# Patient Record
Sex: MALE | Race: WHITE | NOT HISPANIC OR LATINO | Employment: FULL TIME | URBAN - METROPOLITAN AREA
[De-identification: names, ages, dates, MRNs, and addresses within clinical notes are randomized per-mention and may not be internally consistent; named-entity substitution may affect disease eponyms.]

---

## 2017-03-23 ENCOUNTER — HOSPITAL ENCOUNTER (OUTPATIENT)
Dept: FAMILY MEDICINE CLINIC | Facility: CLINIC | Age: 57
Setting detail: SPECIMEN
Discharge: HOME | End: 2017-03-23
Attending: FAMILY MEDICINE | Admitting: FAMILY MEDICINE

## 2017-03-23 LAB
ALBUMIN SERPL-MCNC: 3.7 G/DL (ref 3.5–4.8)
ALBUMIN/GLOB SERPL: 1.1 {RATIO} (ref 1–1.7)
ALP SERPL-CCNC: 53 IU/L (ref 32–91)
ALT SERPL-CCNC: 36 IU/L (ref 17–63)
ANION GAP SERPL CALC-SCNC: 13.8 MMOL/L (ref 10–20)
AST SERPL-CCNC: 22 IU/L (ref 15–41)
BILIRUB SERPL-MCNC: 0.7 MG/DL (ref 0.3–1.2)
BUN SERPL-MCNC: 12 MG/DL (ref 8–20)
BUN/CREAT SERPL: 17.1 (ref 6.2–20.3)
CALCIUM SERPL-MCNC: 9.6 MG/DL (ref 8.9–10.3)
CHLORIDE SERPL-SCNC: 105 MMOL/L (ref 101–111)
CHOLEST SERPL-MCNC: 206 MG/DL
CHOLEST/HDLC SERPL: 7.5 {RATIO}
CONV CO2: 27 MMOL/L (ref 22–32)
CONV LDL CHOLESTEROL DIRECT: 109 MG/DL (ref 0–100)
CONV TOTAL PROTEIN: 7.2 G/DL (ref 6.1–7.9)
CREAT UR-MCNC: 0.7 MG/DL (ref 0.7–1.2)
GLOBULIN UR ELPH-MCNC: 3.5 G/DL (ref 2.5–3.8)
GLUCOSE SERPL-MCNC: 172 MG/DL (ref 65–99)
HDLC SERPL-MCNC: 27 MG/DL
LDLC/HDLC SERPL: 4 {RATIO}
LIPID INTERPRETATION: ABNORMAL
POTASSIUM SERPL-SCNC: 4.8 MMOL/L (ref 3.6–5.1)
SODIUM SERPL-SCNC: 141 MMOL/L (ref 136–144)
TRIGL SERPL-MCNC: 389 MG/DL
VLDLC SERPL CALC-MCNC: 69.8 MG/DL

## 2017-08-17 ENCOUNTER — HOSPITAL ENCOUNTER (OUTPATIENT)
Dept: FAMILY MEDICINE CLINIC | Facility: CLINIC | Age: 57
Setting detail: SPECIMEN
Discharge: HOME | End: 2017-08-17
Attending: FAMILY MEDICINE | Admitting: FAMILY MEDICINE

## 2017-08-17 LAB
ALBUMIN SERPL-MCNC: 4.1 G/DL (ref 3.5–4.8)
ALBUMIN/GLOB SERPL: 1.6 {RATIO} (ref 1–1.7)
ALP SERPL-CCNC: 47 IU/L (ref 32–91)
ALT SERPL-CCNC: 28 IU/L (ref 17–63)
ANION GAP SERPL CALC-SCNC: 13.7 MMOL/L (ref 10–20)
AST SERPL-CCNC: 21 IU/L (ref 15–41)
BILIRUB SERPL-MCNC: 1 MG/DL (ref 0.3–1.2)
BUN SERPL-MCNC: 18 MG/DL (ref 8–20)
BUN/CREAT SERPL: 25.7 (ref 6.2–20.3)
CALCIUM SERPL-MCNC: 9.6 MG/DL (ref 8.9–10.3)
CHLORIDE SERPL-SCNC: 106 MMOL/L (ref 101–111)
CHOLEST SERPL-MCNC: 199 MG/DL
CHOLEST/HDLC SERPL: 7.6 {RATIO}
CONV CO2: 24 MMOL/L (ref 22–32)
CONV LDL CHOLESTEROL DIRECT: 124 MG/DL (ref 0–100)
CONV TOTAL PROTEIN: 6.6 G/DL (ref 6.1–7.9)
CREAT UR-MCNC: 0.7 MG/DL (ref 0.7–1.2)
GLOBULIN UR ELPH-MCNC: 2.5 G/DL (ref 2.5–3.8)
GLUCOSE SERPL-MCNC: 177 MG/DL (ref 65–99)
HDLC SERPL-MCNC: 26 MG/DL
LDLC/HDLC SERPL: 4.7 {RATIO}
LIPID INTERPRETATION: ABNORMAL
POTASSIUM SERPL-SCNC: 4.7 MMOL/L (ref 3.6–5.1)
SODIUM SERPL-SCNC: 139 MMOL/L (ref 136–144)
TRIGL SERPL-MCNC: 308 MG/DL
VLDLC SERPL CALC-MCNC: 48.4 MG/DL

## 2018-01-25 ENCOUNTER — HOSPITAL ENCOUNTER (OUTPATIENT)
Dept: FAMILY MEDICINE CLINIC | Facility: CLINIC | Age: 58
Setting detail: SPECIMEN
Discharge: HOME | End: 2018-01-25
Attending: FAMILY MEDICINE | Admitting: FAMILY MEDICINE

## 2018-01-25 LAB
ALBUMIN SERPL-MCNC: 4.3 G/DL (ref 3.5–4.8)
ALBUMIN/GLOB SERPL: 1.4 {RATIO} (ref 1–1.7)
ALP SERPL-CCNC: 55 IU/L (ref 32–91)
ALT SERPL-CCNC: 30 IU/L (ref 17–63)
ANION GAP SERPL CALC-SCNC: 11.7 MMOL/L (ref 10–20)
AST SERPL-CCNC: 18 IU/L (ref 15–41)
BILIRUB SERPL-MCNC: 0.8 MG/DL (ref 0.3–1.2)
BUN SERPL-MCNC: 14 MG/DL (ref 8–20)
BUN/CREAT SERPL: 23.3 (ref 6.2–20.3)
CALCIUM SERPL-MCNC: 9.7 MG/DL (ref 8.9–10.3)
CHLORIDE SERPL-SCNC: 102 MMOL/L (ref 101–111)
CHOLEST SERPL-MCNC: 200 MG/DL
CHOLEST/HDLC SERPL: 7.4 {RATIO}
CONV CO2: 26 MMOL/L (ref 22–32)
CONV LDL CHOLESTEROL DIRECT: 110 MG/DL (ref 0–100)
CONV TOTAL PROTEIN: 7.3 G/DL (ref 6.1–7.9)
CREAT UR-MCNC: 0.6 MG/DL (ref 0.7–1.2)
GLOBULIN UR ELPH-MCNC: 3 G/DL (ref 2.5–3.8)
GLUCOSE SERPL-MCNC: 180 MG/DL (ref 65–99)
HDLC SERPL-MCNC: 27 MG/DL
LDLC/HDLC SERPL: 4 {RATIO}
LIPID INTERPRETATION: ABNORMAL
POTASSIUM SERPL-SCNC: 4.7 MMOL/L (ref 3.6–5.1)
SODIUM SERPL-SCNC: 135 MMOL/L (ref 136–144)
TRIGL SERPL-MCNC: 395 MG/DL
VLDLC SERPL CALC-MCNC: 62.9 MG/DL

## 2019-04-18 ENCOUNTER — HOSPITAL ENCOUNTER (OUTPATIENT)
Dept: FAMILY MEDICINE CLINIC | Facility: CLINIC | Age: 59
Setting detail: SPECIMEN
Discharge: HOME | End: 2019-04-18
Attending: FAMILY MEDICINE | Admitting: FAMILY MEDICINE

## 2019-04-18 ENCOUNTER — CONVERSION ENCOUNTER (OUTPATIENT)
Dept: FAMILY MEDICINE CLINIC | Facility: CLINIC | Age: 59
End: 2019-04-18

## 2019-04-18 LAB
ALBUMIN SERPL-MCNC: 4.3 G/DL (ref 3.5–4.8)
ALBUMIN/GLOB SERPL: 1.6 {RATIO} (ref 1–1.7)
ALP SERPL-CCNC: 56 IU/L (ref 32–91)
ALT SERPL-CCNC: 35 IU/L (ref 17–63)
ANION GAP SERPL CALC-SCNC: 14.5 MMOL/L (ref 10–20)
AST SERPL-CCNC: 22 IU/L (ref 15–41)
BILIRUB SERPL-MCNC: 0.8 MG/DL (ref 0.3–1.2)
BUN SERPL-MCNC: 15 MG/DL (ref 8–20)
BUN/CREAT SERPL: 25 (ref 6.2–20.3)
CALCIUM SERPL-MCNC: 9.4 MG/DL (ref 8.9–10.3)
CHLORIDE SERPL-SCNC: 105 MMOL/L (ref 101–111)
CHOLEST SERPL-MCNC: 205 MG/DL
CHOLEST/HDLC SERPL: 7.9 {RATIO}
CONV CO2: 22 MMOL/L (ref 22–32)
CONV LDL CHOLESTEROL DIRECT: 119 MG/DL (ref 0–100)
CONV TOTAL PROTEIN: 7 G/DL (ref 6.1–7.9)
CREAT UR-MCNC: 0.6 MG/DL (ref 0.7–1.2)
GLOBULIN UR ELPH-MCNC: 2.7 G/DL (ref 2.5–3.8)
GLUCOSE SERPL-MCNC: 154 MG/DL (ref 65–99)
HDLC SERPL-MCNC: 26 MG/DL
LDLC/HDLC SERPL: 4.6 {RATIO}
LIPID INTERPRETATION: ABNORMAL
POTASSIUM SERPL-SCNC: 4.5 MMOL/L (ref 3.6–5.1)
SODIUM SERPL-SCNC: 137 MMOL/L (ref 136–144)
TRIGL SERPL-MCNC: 453 MG/DL
VLDLC SERPL CALC-MCNC: 59.7 MG/DL

## 2019-06-04 VITALS
SYSTOLIC BLOOD PRESSURE: 155 MMHG | WEIGHT: 242 LBS | OXYGEN SATURATION: 96 % | HEART RATE: 70 BPM | HEIGHT: 73 IN | BODY MASS INDEX: 32.07 KG/M2 | DIASTOLIC BLOOD PRESSURE: 92 MMHG

## 2019-07-16 RX ORDER — NADOLOL 40 MG/1
1 TABLET ORAL EVERY 24 HOURS
COMMUNITY
Start: 2017-11-09 | End: 2020-03-29

## 2019-07-16 RX ORDER — FENOFIBRATE 145 MG/1
1 TABLET, COATED ORAL EVERY 24 HOURS
COMMUNITY
Start: 2018-09-09

## 2019-07-16 RX ORDER — GLIMEPIRIDE 2 MG/1
1 TABLET ORAL EVERY 24 HOURS
COMMUNITY
Start: 2018-01-25

## 2019-07-16 RX ORDER — LISINOPRIL 20 MG/1
1 TABLET ORAL EVERY 24 HOURS
COMMUNITY
Start: 2018-07-05 | End: 2019-10-02 | Stop reason: SDUPTHER

## 2019-07-16 RX ORDER — PRAVASTATIN SODIUM 20 MG
1 TABLET ORAL
COMMUNITY
Start: 2016-09-15 | End: 2019-07-19

## 2019-07-18 ENCOUNTER — OFFICE VISIT (OUTPATIENT)
Dept: FAMILY MEDICINE CLINIC | Facility: CLINIC | Age: 59
End: 2019-07-18

## 2019-07-18 ENCOUNTER — LAB (OUTPATIENT)
Dept: FAMILY MEDICINE CLINIC | Facility: CLINIC | Age: 59
End: 2019-07-18

## 2019-07-18 VITALS
SYSTOLIC BLOOD PRESSURE: 142 MMHG | HEART RATE: 58 BPM | DIASTOLIC BLOOD PRESSURE: 84 MMHG | TEMPERATURE: 97.3 F | BODY MASS INDEX: 30.35 KG/M2 | OXYGEN SATURATION: 98 % | WEIGHT: 229 LBS | HEIGHT: 73 IN

## 2019-07-18 DIAGNOSIS — E11.65 TYPE 2 DIABETES MELLITUS WITH HYPERGLYCEMIA, WITHOUT LONG-TERM CURRENT USE OF INSULIN (HCC): ICD-10-CM

## 2019-07-18 DIAGNOSIS — E78.5 HYPERLIPIDEMIA, UNSPECIFIED HYPERLIPIDEMIA TYPE: ICD-10-CM

## 2019-07-18 DIAGNOSIS — E11.65 TYPE 2 DIABETES MELLITUS WITH HYPERGLYCEMIA, WITHOUT LONG-TERM CURRENT USE OF INSULIN (HCC): Primary | ICD-10-CM

## 2019-07-18 DIAGNOSIS — I10 ESSENTIAL HYPERTENSION: ICD-10-CM

## 2019-07-18 LAB
ALBUMIN SERPL-MCNC: 4.3 G/DL (ref 3.5–4.8)
ALBUMIN/GLOB SERPL: 1.5 G/DL (ref 1–1.7)
ALP SERPL-CCNC: 71 U/L (ref 32–91)
ALT SERPL W P-5'-P-CCNC: 22 U/L (ref 17–63)
ANION GAP SERPL CALCULATED.3IONS-SCNC: 14.4 MMOL/L (ref 5–15)
ARTICHOKE IGE QN: 182 MG/DL (ref 0–100)
AST SERPL-CCNC: 16 U/L (ref 15–41)
BILIRUB SERPL-MCNC: 0.9 MG/DL (ref 0.3–1.2)
BUN BLD-MCNC: 10 MG/DL (ref 8–20)
BUN/CREAT SERPL: 16.7 (ref 6.2–20.3)
CALCIUM SPEC-SCNC: 9.6 MG/DL (ref 8.9–10.3)
CHLORIDE SERPL-SCNC: 105 MMOL/L (ref 101–111)
CHOLEST SERPL-MCNC: 235 MG/DL
CO2 SERPL-SCNC: 23 MMOL/L (ref 22–32)
CREAT BLD-MCNC: 0.6 MG/DL (ref 0.7–1.2)
GFR SERPL CREATININE-BSD FRML MDRD: 138 ML/MIN/1.73
GLOBULIN UR ELPH-MCNC: 2.8 GM/DL (ref 2.5–3.8)
GLUCOSE BLD-MCNC: 100 MG/DL (ref 65–99)
HDLC SERPL QL: 8.39
HDLC SERPL-MCNC: 28 MG/DL
LDLC/HDLC SERPL: 5.88 {RATIO}
POTASSIUM BLD-SCNC: 4.4 MMOL/L (ref 3.6–5.1)
PROT SERPL-MCNC: 7.1 G/DL (ref 6.1–7.9)
SODIUM BLD-SCNC: 138 MMOL/L (ref 136–144)
TRIGL SERPL-MCNC: 212 MG/DL
VLDLC SERPL-MCNC: 42.4 MG/DL

## 2019-07-18 PROCEDURE — 99214 OFFICE O/P EST MOD 30 MIN: CPT | Performed by: FAMILY MEDICINE

## 2019-07-18 PROCEDURE — 36415 COLL VENOUS BLD VENIPUNCTURE: CPT | Performed by: FAMILY MEDICINE

## 2019-07-18 PROCEDURE — 80053 COMPREHEN METABOLIC PANEL: CPT | Performed by: FAMILY MEDICINE

## 2019-07-18 PROCEDURE — 80061 LIPID PANEL: CPT | Performed by: FAMILY MEDICINE

## 2019-07-18 PROCEDURE — 83036 HEMOGLOBIN GLYCOSYLATED A1C: CPT | Performed by: FAMILY MEDICINE

## 2019-07-18 NOTE — PROGRESS NOTES
Subjective   Efren Mann is a 58 y.o. male.     Diabetes   He presents for his follow-up diabetic visit. He has type 2 diabetes mellitus. His disease course has been improving. There are no hypoglycemic associated symptoms. Pertinent negatives for hypoglycemia include no dizziness, nervousness/anxiousness or tremors. Pertinent negatives for diabetes include no blurred vision, no chest pain, no fatigue, no foot paresthesias, no foot ulcerations, no polydipsia, no polyphagia, no polyuria, no visual change and no weakness. There are no hypoglycemic complications. Symptoms are improving. Risk factors for coronary artery disease include dyslipidemia, hypertension and obesity. Current diabetic treatment includes diet and oral agent (triple therapy). His weight is stable. He participates in exercise intermittently. His overall blood glucose range is 110-130 mg/dl. An ACE inhibitor/angiotensin II receptor blocker is being taken. Eye exam is current.       HTN  The patient presented for 3 months  follow-up on hypertension and hyperlipidemia. The problem has been gradually improving since onset. The problem is controlled. Pertinent negatives include no anxiety, blurred vision, chest pain, palpitations, peripheral edema or shortness of breath. Risk factors for coronary artery disease include dyslipidemia. Current  treatment includes ACE inhibitors, statin and lifestyle changes.     The following portions of the patient's history were reviewed and updated as appropriate: past family history, past social history and problem list.    Review of Systems   Constitutional: Negative for activity change, appetite change, fatigue and fever.   HENT: Negative for postnasal drip, sinus pressure, sore throat and trouble swallowing.    Eyes: Negative for blurred vision, double vision and visual disturbance.   Respiratory: Negative for cough, shortness of breath and wheezing.    Cardiovascular: Negative for chest pain, palpitations and  leg swelling.   Gastrointestinal: Negative for abdominal pain, diarrhea, nausea and vomiting.   Endocrine: Negative for cold intolerance, heat intolerance, polydipsia, polyphagia and polyuria.   Musculoskeletal: Negative for back pain.   Skin: Negative for rash.   Neurological: Negative for dizziness, tremors, weakness and headache.   Psychiatric/Behavioral: Negative for depressed mood. The patient is not nervous/anxious.        Objective   Physical Exam   Constitutional: He is oriented to person, place, and time. He appears well-developed.   HENT:   Head: Normocephalic.   Mouth/Throat: Oropharynx is clear and moist.   Eyes: Conjunctivae and EOM are normal. Pupils are equal, round, and reactive to light.   Neck: Normal range of motion. Neck supple. No thyromegaly present.   Cardiovascular: Normal rate, regular rhythm, normal heart sounds and intact distal pulses.   Pulmonary/Chest: Breath sounds normal. He has no wheezes. He exhibits no tenderness.   Abdominal: Soft. Bowel sounds are normal. There is no tenderness.   Musculoskeletal: Normal range of motion.    Efren had a diabetic foot exam performed today.   During the foot exam he had a monofilament test performed.  Skin Integrity  -  His right foot skin is intact.His left foot skin is intact..  Lymphadenopathy:     He has no cervical adenopathy.   Neurological: He is alert and oriented to person, place, and time.   Skin: No rash noted.   Foot exam normal no ulcer or wound.   Psychiatric: He has a normal mood and affect.   Vitals reviewed.        Assessment/Plan   Problems Addressed this Visit        Cardiovascular and Mediastinum    Hyperlipidemia     Lipid abnormalities are improving with treatment.  Nutritional counseling was provided.  Lipids will be reassessed in 3 months.         Relevant Medications    fenofibrate (TRICOR) 145 MG tablet    Other Relevant Orders    Lipid Panel (Completed)    Comprehensive Metabolic Panel (Completed)    Essential  hypertension     Hypertension is improving with treatment.  Continue current treatment regimen.  Dietary sodium restriction.  Weight loss.  Blood pressure will be reassessed in 3 months.         Relevant Medications    lisinopril (PRINIVIL,ZESTRIL) 20 MG tablet    nadolol (CORGARD) 40 MG tablet    Other Relevant Orders    Lipid Panel (Completed)    Comprehensive Metabolic Panel (Completed)       Endocrine    Type 2 diabetes mellitus with hyperglycemia, without long-term current use of insulin (CMS/[facility]) - Primary     Complications of  diabetes discussed with the patient in detail including micro-and macro vascular complications. Discussed low carb diet, maintaining ideal body weight and regular exercise program as physiologic means to achieve blood sugar control.  Advised to check blood sugar daily, continue current treatment regimen and follow lifestyle changes.            Relevant Medications    glimepiride (AMARYL) 2 MG tablet    metFORMIN (GLUCOPHAGE) 1000 MG tablet    SITagliptin (JANUVIA) 100 MG tablet    Other Relevant Orders    Lipid Panel (Completed)    Hemoglobin A1c (Completed)    Comprehensive Metabolic Panel (Completed)    Microalbumin / Creatinine Urine Ratio - Urine, Clean Catch

## 2019-07-19 PROBLEM — E78.5 HYPERLIPIDEMIA: Status: ACTIVE | Noted: 2019-07-19

## 2019-07-19 PROBLEM — E11.65 TYPE 2 DIABETES MELLITUS WITH HYPERGLYCEMIA, WITHOUT LONG-TERM CURRENT USE OF INSULIN (HCC): Status: ACTIVE | Noted: 2019-07-19

## 2019-07-19 PROBLEM — I10 ESSENTIAL HYPERTENSION: Status: ACTIVE | Noted: 2019-07-19

## 2019-07-19 LAB — HBA1C MFR BLD: 6.1 % (ref 3.5–5.6)

## 2019-07-19 RX ORDER — PRAVASTATIN SODIUM 40 MG
40 TABLET ORAL
Qty: 90 TABLET | Refills: 3 | Status: SHIPPED | OUTPATIENT
Start: 2019-07-19 | End: 2020-01-09 | Stop reason: SINTOL

## 2019-07-19 NOTE — ASSESSMENT & PLAN NOTE
Complications of  diabetes discussed with the patient in detail including micro-and macro vascular complications. Discussed low carb diet, maintaining ideal body weight and regular exercise program as physiologic means to achieve blood sugar control.  Advised to check blood sugar daily, continue current treatment regimen and follow lifestyle changes.

## 2019-07-19 NOTE — ASSESSMENT & PLAN NOTE
Hypertension is improving with treatment.  Continue current treatment regimen.  Dietary sodium restriction.  Weight loss.  Blood pressure will be reassessed in 3 months.

## 2019-07-19 NOTE — ASSESSMENT & PLAN NOTE
Lipid abnormalities are improving with treatment.  Nutritional counseling was provided.  Lipids will be reassessed in 3 months.

## 2019-10-02 RX ORDER — LISINOPRIL 20 MG/1
20 TABLET ORAL EVERY 24 HOURS
Qty: 90 TABLET | Refills: 1 | Status: SHIPPED | OUTPATIENT
Start: 2019-10-02 | End: 2019-10-03 | Stop reason: SDUPTHER

## 2019-10-03 RX ORDER — LISINOPRIL 20 MG/1
TABLET ORAL
Qty: 90 TABLET | Refills: 3 | Status: SHIPPED | OUTPATIENT
Start: 2019-10-03 | End: 2020-03-29

## 2020-01-09 ENCOUNTER — OFFICE VISIT (OUTPATIENT)
Dept: FAMILY MEDICINE CLINIC | Facility: CLINIC | Age: 60
End: 2020-01-09

## 2020-01-09 VITALS
DIASTOLIC BLOOD PRESSURE: 89 MMHG | HEIGHT: 73 IN | SYSTOLIC BLOOD PRESSURE: 146 MMHG | OXYGEN SATURATION: 98 % | TEMPERATURE: 97.1 F | BODY MASS INDEX: 30.88 KG/M2 | WEIGHT: 233 LBS | HEART RATE: 66 BPM

## 2020-01-09 DIAGNOSIS — I10 ESSENTIAL HYPERTENSION: ICD-10-CM

## 2020-01-09 DIAGNOSIS — E78.2 MIXED HYPERLIPIDEMIA: ICD-10-CM

## 2020-01-09 DIAGNOSIS — E11.65 TYPE 2 DIABETES MELLITUS WITH HYPERGLYCEMIA, WITHOUT LONG-TERM CURRENT USE OF INSULIN (HCC): Primary | ICD-10-CM

## 2020-01-09 PROCEDURE — 80053 COMPREHEN METABOLIC PANEL: CPT | Performed by: FAMILY MEDICINE

## 2020-01-09 PROCEDURE — 80061 LIPID PANEL: CPT | Performed by: FAMILY MEDICINE

## 2020-01-09 PROCEDURE — 83036 HEMOGLOBIN GLYCOSYLATED A1C: CPT | Performed by: FAMILY MEDICINE

## 2020-01-09 PROCEDURE — 99214 OFFICE O/P EST MOD 30 MIN: CPT | Performed by: FAMILY MEDICINE

## 2020-01-09 PROCEDURE — 36415 COLL VENOUS BLD VENIPUNCTURE: CPT | Performed by: FAMILY MEDICINE

## 2020-01-09 PROCEDURE — 82043 UR ALBUMIN QUANTITATIVE: CPT | Performed by: FAMILY MEDICINE

## 2020-01-09 NOTE — ASSESSMENT & PLAN NOTE
Diabetes is improving with treatment.   Continue current treatment regimen.  Reminded to bring in blood sugar diary at next visit.  Dietary recommendations for ADA diet.  Regular aerobic exercise.  Discussed ways to avoid symptomatic hypoglycemia.  Discussed sick day management.  Discussed foot care.  Reminded to get yearly retinal exam.  Diabetes will be reassessed in 6 months.

## 2020-01-09 NOTE — ASSESSMENT & PLAN NOTE
Hypertension is unchanged.  Continue current treatment regimen.  Dietary sodium restriction.  Weight loss.  Regular aerobic exercise.  Stop smoking.  Blood pressure will be reassessed in 6 months.

## 2020-01-09 NOTE — PROGRESS NOTES
Subjective   Efren Mann is a 59 y.o. male.     History of Present Illness     Diabetes   He presents for his follow-up diabetic visit. He has type 2 diabetes mellitus. His disease course has been improving. There are no hypoglycemic associated symptoms. Pertinent negatives for hypoglycemia include no dizziness, nervousness/anxiousness or tremors. Pertinent negatives for diabetes include no blurred vision, no chest pain, no fatigue, no foot paresthesias, no foot ulcerations, no polydipsia, no polyphagia, no polyuria, no visual change and no weakness. There are no hypoglycemic complications. Symptoms are improving. Risk factors for coronary artery disease include dyslipidemia, hypertension and obesity. Current diabetic treatment includes diet and oral agent (triple therapy). His weight is stable.  His overall blood glucose range is 110-130 mg/dl. An ACE inhibitor/angiotensin II receptor blocker is being taken. Eye exam is current.       HTN  The patient present for 6 months  follow-up on hypertension and hyperlipidemia. The problem has been gradually improving since onset. The problem is controlled. Pertinent negatives include no anxiety, blurred vision, chest pain, palpitations, peripheral edema or shortness of breath. Risk factors for coronary artery disease include dyslipidemia. Current  treatment includes ACE inhibitors and lifestyle changes.  The patient has  stopped statin due to side effects.    The following portions of the patient's history were reviewed and updated as appropriate: past medical history, past social history, past surgical history and problem list.    Review of Systems   Constitutional: Negative for fatigue and fever.   HENT: Negative for sinus pressure and trouble swallowing.    Eyes: Negative for blurred vision and visual disturbance.   Respiratory: Negative for cough, shortness of breath and wheezing.    Cardiovascular: Negative for chest pain, palpitations and leg swelling.    Gastrointestinal: Negative for abdominal pain, diarrhea, nausea and vomiting.   Endocrine: Negative for cold intolerance, polydipsia, polyphagia and polyuria.   Musculoskeletal: Negative for back pain.   Neurological: Negative for dizziness, tremors, weakness and headache.   Psychiatric/Behavioral: Negative for sleep disturbance, suicidal ideas and depressed mood. The patient is not nervous/anxious.        Objective   Physical Exam   Constitutional: He is oriented to person, place, and time. He appears well-developed.   HENT:   Head: Normocephalic.   Mouth/Throat: Oropharynx is clear and moist.   Eyes: Pupils are equal, round, and reactive to light. Conjunctivae and EOM are normal.   Neck: Normal range of motion. Neck supple.   Cardiovascular: Normal rate, regular rhythm and normal heart sounds.   Pulmonary/Chest: Effort normal and breath sounds normal. He has no wheezes.   Abdominal: Soft. Bowel sounds are normal. There is no tenderness.   Musculoskeletal: Normal range of motion.   Neurological: He is alert and oriented to person, place, and time.   Psychiatric: He has a normal mood and affect.   Vitals reviewed.        Assessment/Plan   Problems Addressed this Visit        Cardiovascular and Mediastinum    Hyperlipidemia     Lipid abnormalities are worsening.  Patient is unable to tolerate a statin due to side effects.  Nutritional counseling was provided.  Lipids will be reassessed in 6 months.         Relevant Orders    Lipid panel    Comprehensive metabolic panel    Essential hypertension     Hypertension is unchanged.  Continue current treatment regimen.  Dietary sodium restriction.  Weight loss.  Regular aerobic exercise.  Stop smoking.  Blood pressure will be reassessed in 6 months.         Relevant Orders    Lipid panel    Comprehensive metabolic panel       Endocrine    Type 2 diabetes mellitus with hyperglycemia, without long-term current use of insulin (CMS/[facility]) - Primary     Diabetes is improving with  treatment.   Continue current treatment regimen.  Reminded to bring in blood sugar diary at next visit.  Dietary recommendations for ADA diet.  Regular aerobic exercise.  Discussed ways to avoid symptomatic hypoglycemia.  Discussed sick day management.  Discussed foot care.  Reminded to get yearly retinal exam.  Diabetes will be reassessed in 6 months.         Relevant Orders    Lipid panel    Hemoglobin A1c    Comprehensive metabolic panel    MicroAlbumin, Urine, Random - Urine, Clean Catch

## 2020-01-09 NOTE — ASSESSMENT & PLAN NOTE
Lipid abnormalities are worsening.  Patient is unable to tolerate a statin due to side effects.  Nutritional counseling was provided.  Lipids will be reassessed in 6 months.

## 2020-01-10 LAB
ALBUMIN SERPL-MCNC: 4.7 G/DL (ref 3.5–5.2)
ALBUMIN UR-MCNC: <1.2 MG/DL
ALBUMIN/GLOB SERPL: 1.5 G/DL
ALP SERPL-CCNC: 72 U/L (ref 39–117)
ALT SERPL W P-5'-P-CCNC: 18 U/L (ref 1–41)
ANION GAP SERPL CALCULATED.3IONS-SCNC: 10.6 MMOL/L (ref 5–15)
AST SERPL-CCNC: 14 U/L (ref 1–40)
BILIRUB SERPL-MCNC: 0.5 MG/DL (ref 0.2–1.2)
BUN BLD-MCNC: 15 MG/DL (ref 6–20)
BUN/CREAT SERPL: 22.7 (ref 7–25)
CALCIUM SPEC-SCNC: 9.7 MG/DL (ref 8.6–10.5)
CHLORIDE SERPL-SCNC: 98 MMOL/L (ref 98–107)
CHOLEST SERPL-MCNC: 215 MG/DL (ref 0–200)
CO2 SERPL-SCNC: 27.4 MMOL/L (ref 22–29)
CREAT BLD-MCNC: 0.66 MG/DL (ref 0.76–1.27)
GFR SERPL CREATININE-BSD FRML MDRD: 124 ML/MIN/1.73
GLOBULIN UR ELPH-MCNC: 3.2 GM/DL
GLUCOSE BLD-MCNC: 141 MG/DL (ref 65–99)
HBA1C MFR BLD: 6.8 % (ref 3.5–5.6)
HDLC SERPL-MCNC: 31 MG/DL (ref 40–60)
LDLC SERPL CALC-MCNC: 131 MG/DL (ref 0–100)
LDLC/HDLC SERPL: 4.23 {RATIO}
POTASSIUM BLD-SCNC: 4.2 MMOL/L (ref 3.5–5.2)
PROT SERPL-MCNC: 7.9 G/DL (ref 6–8.5)
SODIUM BLD-SCNC: 136 MMOL/L (ref 136–145)
TRIGL SERPL-MCNC: 265 MG/DL (ref 0–150)
VLDLC SERPL-MCNC: 53 MG/DL (ref 5–40)

## 2020-03-29 RX ORDER — NADOLOL 40 MG/1
TABLET ORAL
Qty: 90 TABLET | Refills: 1 | Status: SHIPPED | OUTPATIENT
Start: 2020-03-29 | End: 2020-10-19

## 2020-03-29 RX ORDER — LISINOPRIL 20 MG/1
TABLET ORAL
Qty: 90 TABLET | Refills: 3 | Status: SHIPPED | OUTPATIENT
Start: 2020-03-29 | End: 2021-03-18

## 2020-05-29 RX ORDER — SITAGLIPTIN 100 MG/1
TABLET, FILM COATED ORAL
Qty: 90 TABLET | Refills: 3 | Status: SHIPPED | OUTPATIENT
Start: 2020-05-29 | End: 2021-05-21

## 2020-07-16 ENCOUNTER — OFFICE VISIT (OUTPATIENT)
Dept: FAMILY MEDICINE CLINIC | Facility: CLINIC | Age: 60
End: 2020-07-16

## 2020-07-16 VITALS
HEART RATE: 70 BPM | DIASTOLIC BLOOD PRESSURE: 108 MMHG | WEIGHT: 231 LBS | SYSTOLIC BLOOD PRESSURE: 181 MMHG | TEMPERATURE: 95.4 F | OXYGEN SATURATION: 98 % | BODY MASS INDEX: 30.9 KG/M2

## 2020-07-16 DIAGNOSIS — Z12.5 SCREENING PSA (PROSTATE SPECIFIC ANTIGEN): ICD-10-CM

## 2020-07-16 DIAGNOSIS — E11.65 TYPE 2 DIABETES MELLITUS WITH HYPERGLYCEMIA, WITHOUT LONG-TERM CURRENT USE OF INSULIN (HCC): ICD-10-CM

## 2020-07-16 DIAGNOSIS — E78.2 MIXED HYPERLIPIDEMIA: ICD-10-CM

## 2020-07-16 DIAGNOSIS — I10 ESSENTIAL HYPERTENSION: Primary | ICD-10-CM

## 2020-07-16 DIAGNOSIS — Z12.11 COLON CANCER SCREENING: ICD-10-CM

## 2020-07-16 PROCEDURE — 99214 OFFICE O/P EST MOD 30 MIN: CPT | Performed by: FAMILY MEDICINE

## 2020-07-16 NOTE — PROGRESS NOTES
Subjective   Efren Mann is a 59 y.o. male.     History of Present Illness     Diabetes   He presents for his follow-up diabetic visit. He has type 2 diabetes mellitus. His disease course has been improving. There are no hypoglycemic associated symptoms. Pertinent negatives for hypoglycemia include no dizziness, nervousness/anxiousness or tremors. Pertinent negatives for diabetes include no blurred vision, no chest pain, no fatigue, no foot paresthesias, no foot ulcerations, no polydipsia, no polyphagia, no polyuria, no visual change and no weakness. Risk factors for coronary artery disease include dyslipidemia, hypertension and obesity. Current diabetic treatment includes diet and oral agent (triple therapy).  An ACE inhibitor/angiotensin II receptor blocker is being taken.     HTN  The patient  also present for 6 months  follow-up on hypertension and hyperlipidemia. BP elevated,  pertinent negatives include no headache, anxiety, blurred vision, chest pain, palpitations, peripheral edema or shortness of breath.     The following portions of the patient's history were reviewed and updated as appropriate: past medical history, past social history, past surgical history and problem list.    Review of Systems   Constitutional: Negative for fatigue and fever.   HENT: Negative for ear pain, sinus pressure and sore throat.    Eyes: Negative for blurred vision.   Respiratory: Negative for cough, shortness of breath and wheezing.    Cardiovascular: Negative for chest pain, palpitations and leg swelling.   Gastrointestinal: Negative for abdominal pain, diarrhea, nausea and vomiting.   Endocrine: Negative for cold intolerance, polydipsia, polyphagia and polyuria.   Musculoskeletal: Negative for back pain.   Skin: Negative for rash.   Neurological: Negative for dizziness, tremors, weakness and headache.   Psychiatric/Behavioral: Negative for depressed mood. The patient is not nervous/anxious.        Objective   Physical  Exam   Constitutional: He is oriented to person, place, and time. He appears well-developed.   Eyes: Pupils are equal, round, and reactive to light. EOM are normal.   Neck: Normal range of motion. Neck supple. No thyromegaly present.   Cardiovascular: Normal rate, regular rhythm and normal heart sounds.   Pulmonary/Chest: Effort normal and breath sounds normal. He has no wheezes.   Abdominal: Soft. Bowel sounds are normal. There is no tenderness.   Musculoskeletal: Normal range of motion.   Neurological: He is alert and oriented to person, place, and time.   Psychiatric: He has a normal mood and affect.   Vitals reviewed.    Vitals:    07/16/20 1133   BP: (!) 181/108   Pulse: 70   Temp: 95.4 °F (35.2 °C)   SpO2: 98%     Current Outpatient Medications on File Prior to Visit   Medication Sig Dispense Refill   • fenofibrate (TRICOR) 145 MG tablet Take 1 tablet by mouth Daily.     • glimepiride (AMARYL) 2 MG tablet Take 1 tablet by mouth Daily.     • JANUVIA 100 MG tablet TAKE 1 TABLET BY MOUTH EVERY DAY 90 tablet 3   • lisinopril (PRINIVIL,ZESTRIL) 20 MG tablet TAKE 1 TABLET BY MOUTH ONCE DAILY 90 tablet 3   • metFORMIN (GLUCOPHAGE) 1000 MG tablet TAKE 2 TABLETS BY MOUTH ONCE DAILY 180 tablet 1   • nadolol (CORGARD) 40 MG tablet TAKE 1 TABLET BY MOUTH ONCE DAILY 90 tablet 1     No current facility-administered medications on file prior to visit.            Assessment/Plan   Problems Addressed this Visit        Cardiovascular and Mediastinum    Hyperlipidemia     We will check fasting lipid panel.  Patient unable to tolerate statin, advise to watch low fat diet and exercise.         Relevant Orders    Comprehensive metabolic panel    Lipid panel    Essential hypertension - Primary     Hypertension is worsening. The patient state monitoring  BP at home, average 150/90.   Dietary sodium restriction.  Regular aerobic exercise.  Continue current medications.  Blood pressure will be reassessed in 3 months.         Relevant  Orders    Comprehensive metabolic panel    Lipid panel       Endocrine    Type 2 diabetes mellitus with hyperglycemia, without long-term current use of insulin (CMS/[facility])     Diabetes is improving with treatment.   Reminded to bring in blood sugar diary at next visit.  Dietary recommendations for ADA diet.  Regular aerobic exercise.  Discussed ways to avoid symptomatic hypoglycemia.  Discussed sick day management.  Discussed foot care.  Reminded to get yearly retinal exam.  Diabetes will be reassessed in 3 months.         Relevant Orders    Hemoglobin A1c    Comprehensive metabolic panel    Lipid panel       Other    Colon cancer screening    Relevant Orders    Ambulatory Referral For Screening Colonoscopy    Screening PSA (prostate specific antigen)    Relevant Orders    PSA SCREENING

## 2020-07-20 PROBLEM — Z12.5 SCREENING PSA (PROSTATE SPECIFIC ANTIGEN): Status: ACTIVE | Noted: 2020-07-20

## 2020-07-20 PROBLEM — Z12.11 COLON CANCER SCREENING: Status: ACTIVE | Noted: 2020-07-20

## 2020-07-21 NOTE — ASSESSMENT & PLAN NOTE
We will check fasting lipid panel.  Patient unable to tolerate statin, advise to watch low fat diet and exercise.

## 2020-07-21 NOTE — ASSESSMENT & PLAN NOTE
Hypertension is worsening. The patient state monitoring  BP at home, average 150/90.   Dietary sodium restriction.  Regular aerobic exercise.  Continue current medications.  Blood pressure will be reassessed in 3 months.

## 2020-07-21 NOTE — ASSESSMENT & PLAN NOTE
Diabetes is improving with treatment.   Reminded to bring in blood sugar diary at next visit.  Dietary recommendations for ADA diet.  Regular aerobic exercise.  Discussed ways to avoid symptomatic hypoglycemia.  Discussed sick day management.  Discussed foot care.  Reminded to get yearly retinal exam.  Diabetes will be reassessed in 3 months.

## 2020-10-19 RX ORDER — NADOLOL 40 MG/1
TABLET ORAL
Qty: 90 TABLET | Refills: 1 | Status: SHIPPED | OUTPATIENT
Start: 2020-10-19 | End: 2021-04-18

## 2021-03-18 ENCOUNTER — LAB (OUTPATIENT)
Dept: FAMILY MEDICINE CLINIC | Facility: CLINIC | Age: 61
End: 2021-03-18

## 2021-03-18 ENCOUNTER — OFFICE VISIT (OUTPATIENT)
Dept: FAMILY MEDICINE CLINIC | Facility: CLINIC | Age: 61
End: 2021-03-18

## 2021-03-18 VITALS
DIASTOLIC BLOOD PRESSURE: 98 MMHG | OXYGEN SATURATION: 97 % | HEART RATE: 64 BPM | TEMPERATURE: 97.5 F | HEIGHT: 74 IN | SYSTOLIC BLOOD PRESSURE: 190 MMHG | BODY MASS INDEX: 30.42 KG/M2 | WEIGHT: 237 LBS

## 2021-03-18 DIAGNOSIS — Z12.5 SCREENING PSA (PROSTATE SPECIFIC ANTIGEN): ICD-10-CM

## 2021-03-18 DIAGNOSIS — B35.1 TOENAIL FUNGUS: ICD-10-CM

## 2021-03-18 DIAGNOSIS — I10 ESSENTIAL HYPERTENSION: ICD-10-CM

## 2021-03-18 DIAGNOSIS — E11.65 TYPE 2 DIABETES MELLITUS WITH HYPERGLYCEMIA, WITHOUT LONG-TERM CURRENT USE OF INSULIN (HCC): ICD-10-CM

## 2021-03-18 DIAGNOSIS — E78.2 MIXED HYPERLIPIDEMIA: ICD-10-CM

## 2021-03-18 DIAGNOSIS — E11.65 TYPE 2 DIABETES MELLITUS WITH HYPERGLYCEMIA, WITHOUT LONG-TERM CURRENT USE OF INSULIN (HCC): Primary | ICD-10-CM

## 2021-03-18 LAB
ALBUMIN SERPL-MCNC: 4.1 G/DL (ref 3.5–5.2)
ALBUMIN UR-MCNC: <1.2 MG/DL
ALBUMIN/GLOB SERPL: 1.6 G/DL
ALP SERPL-CCNC: 60 U/L (ref 39–117)
ALT SERPL W P-5'-P-CCNC: 13 U/L (ref 1–41)
ANION GAP SERPL CALCULATED.3IONS-SCNC: 9.8 MMOL/L (ref 5–15)
AST SERPL-CCNC: 14 U/L (ref 1–40)
BASOPHILS # BLD AUTO: 0.06 10*3/MM3 (ref 0–0.2)
BASOPHILS NFR BLD AUTO: 0.9 % (ref 0–1.5)
BILIRUB SERPL-MCNC: 0.4 MG/DL (ref 0–1.2)
BUN SERPL-MCNC: 14 MG/DL (ref 8–23)
BUN/CREAT SERPL: 23 (ref 7–25)
CALCIUM SPEC-SCNC: 9.4 MG/DL (ref 8.6–10.5)
CHLORIDE SERPL-SCNC: 102 MMOL/L (ref 98–107)
CHOLEST SERPL-MCNC: 174 MG/DL (ref 0–200)
CO2 SERPL-SCNC: 28.2 MMOL/L (ref 22–29)
CREAT SERPL-MCNC: 0.61 MG/DL (ref 0.76–1.27)
DEPRECATED RDW RBC AUTO: 41.6 FL (ref 37–54)
EOSINOPHIL # BLD AUTO: 0.28 10*3/MM3 (ref 0–0.4)
EOSINOPHIL NFR BLD AUTO: 4.2 % (ref 0.3–6.2)
ERYTHROCYTE [DISTWIDTH] IN BLOOD BY AUTOMATED COUNT: 12.2 % (ref 12.3–15.4)
GFR SERPL CREATININE-BSD FRML MDRD: 135 ML/MIN/1.73
GLOBULIN UR ELPH-MCNC: 2.6 GM/DL
GLUCOSE SERPL-MCNC: 170 MG/DL (ref 65–99)
HBA1C MFR BLD: 7.6 % (ref 3.5–5.6)
HCT VFR BLD AUTO: 39.6 % (ref 37.5–51)
HDLC SERPL-MCNC: 27 MG/DL (ref 40–60)
HGB BLD-MCNC: 14 G/DL (ref 13–17.7)
IMM GRANULOCYTES # BLD AUTO: 0.03 10*3/MM3 (ref 0–0.05)
IMM GRANULOCYTES NFR BLD AUTO: 0.5 % (ref 0–0.5)
LDLC SERPL CALC-MCNC: 95 MG/DL (ref 0–100)
LDLC/HDLC SERPL: 3.18 {RATIO}
LYMPHOCYTES # BLD AUTO: 1.66 10*3/MM3 (ref 0.7–3.1)
LYMPHOCYTES NFR BLD AUTO: 25.1 % (ref 19.6–45.3)
MCH RBC QN AUTO: 33.3 PG (ref 26.6–33)
MCHC RBC AUTO-ENTMCNC: 35.4 G/DL (ref 31.5–35.7)
MCV RBC AUTO: 94.1 FL (ref 79–97)
MONOCYTES # BLD AUTO: 0.64 10*3/MM3 (ref 0.1–0.9)
MONOCYTES NFR BLD AUTO: 9.7 % (ref 5–12)
NEUTROPHILS NFR BLD AUTO: 3.94 10*3/MM3 (ref 1.7–7)
NEUTROPHILS NFR BLD AUTO: 59.6 % (ref 42.7–76)
NRBC BLD AUTO-RTO: 0 /100 WBC (ref 0–0.2)
PLATELET # BLD AUTO: 151 10*3/MM3 (ref 140–450)
PMV BLD AUTO: 12.6 FL (ref 6–12)
POTASSIUM SERPL-SCNC: 4.7 MMOL/L (ref 3.5–5.2)
PROT SERPL-MCNC: 6.7 G/DL (ref 6–8.5)
PSA SERPL-MCNC: 0.61 NG/ML (ref 0–4)
RBC # BLD AUTO: 4.21 10*6/MM3 (ref 4.14–5.8)
SODIUM SERPL-SCNC: 140 MMOL/L (ref 136–145)
TRIGL SERPL-MCNC: 306 MG/DL (ref 0–150)
VLDLC SERPL-MCNC: 52 MG/DL (ref 5–40)
WBC # BLD AUTO: 6.61 10*3/MM3 (ref 3.4–10.8)

## 2021-03-18 PROCEDURE — 80061 LIPID PANEL: CPT | Performed by: FAMILY MEDICINE

## 2021-03-18 PROCEDURE — 82043 UR ALBUMIN QUANTITATIVE: CPT | Performed by: FAMILY MEDICINE

## 2021-03-18 PROCEDURE — 36415 COLL VENOUS BLD VENIPUNCTURE: CPT | Performed by: FAMILY MEDICINE

## 2021-03-18 PROCEDURE — G0103 PSA SCREENING: HCPCS | Performed by: FAMILY MEDICINE

## 2021-03-18 PROCEDURE — 85025 COMPLETE CBC W/AUTO DIFF WBC: CPT | Performed by: FAMILY MEDICINE

## 2021-03-18 PROCEDURE — 80053 COMPREHEN METABOLIC PANEL: CPT | Performed by: FAMILY MEDICINE

## 2021-03-18 PROCEDURE — 99214 OFFICE O/P EST MOD 30 MIN: CPT | Performed by: FAMILY MEDICINE

## 2021-03-18 PROCEDURE — 83036 HEMOGLOBIN GLYCOSYLATED A1C: CPT | Performed by: FAMILY MEDICINE

## 2021-03-18 RX ORDER — LISINOPRIL 40 MG/1
40 TABLET ORAL DAILY
Qty: 90 TABLET | Refills: 3 | Status: SHIPPED | OUTPATIENT
Start: 2021-03-18

## 2021-03-18 NOTE — PROGRESS NOTES
"Chief Complaint  Hypertension, Diabetes, and Hyperlipidemia    Subjective          Efren Mann presents to [facility]  History of Present Illness  Diabetes   He presents for his follow-up diabetic visit. He has type 2 diabetes mellitus. His disease course has been unchanged. There are no hypoglycemic associated symptoms. Pertinent negatives for hypoglycemia include no dizziness, nervousness/anxiousness or tremors. Pertinent negatives for diabetes include no blurred vision, no chest pain, no fatigue, no foot paresthesias, no foot ulcerations, no polydipsia, no polyphagia, no polyuria, no visual change and no weakness. Risk factors for coronary artery disease include dyslipidemia, hypertension and obesity. Current diabetic treatment includes diet and oral agent (triple therapy).  An ACE inhibitor/angiotensin II receptor blocker is being taken.     HTN  The patient  also present for 6 months  follow-up on hypertension and hyperlipidemia. BP elevated,  pertinent negatives include no headache, anxiety, blurred vision, chest pain, palpitations, peripheral edema or shortness of breath.  He is taking lisinopril and a beta-blocker.  Objective   Vital Signs:   BP (!) 190/98 (BP Location: Left arm, Patient Position: Sitting, Cuff Size: Large Adult)   Pulse 64   Temp 97.5 °F (36.4 °C) (Temporal)   Ht 188 cm (74\")   Wt 108 kg (237 lb)   SpO2 97%   BMI 30.43 kg/m²     Physical Exam  Vitals reviewed.   Constitutional:       General: He is not in acute distress.     Appearance: He is well-developed.   Eyes:      Conjunctiva/sclera: Conjunctivae normal.      Pupils: Pupils are equal, round, and reactive to light.   Neck:      Thyroid: No thyromegaly.   Cardiovascular:      Rate and Rhythm: Normal rate and regular rhythm.      Pulses: Normal pulses.      Heart sounds: Normal heart sounds.   Pulmonary:      Effort: Pulmonary effort is normal.      Breath sounds: Normal breath sounds. No " wheezing.   Abdominal:      General: Bowel sounds are normal.      Palpations: Abdomen is soft.      Tenderness: There is no abdominal tenderness.   Musculoskeletal:         General: Normal range of motion.      Cervical back: Normal range of motion and neck supple.   Feet:      Right foot:      Skin integrity: No ulcer, blister, skin breakdown or erythema.      Toenail Condition: Fungal disease present.     Left foot:      Skin integrity: No ulcer, blister, skin breakdown or erythema.      Toenail Condition: Fungal disease present.  Neurological:      General: No focal deficit present.      Mental Status: He is alert and oriented to person, place, and time.   Psychiatric:         Mood and Affect: Mood normal.        Result Review :{Labs  Result Review  Imaging  Med Tab  Media     Assessment and Plan    Diagnoses and all orders for this visit:    1. Type 2 diabetes mellitus with hyperglycemia, without long-term current use of insulin (CMS/[facility]) (Primary)  Assessment & Plan:  Diabetes is unchanged.   Continue current treatment regimen.  Reminded to bring in blood sugar diary at next visit.  Dietary recommendations for ADA diet.  Regular aerobic exercise.  Discussed ways to avoid symptomatic hypoglycemia.  Discussed sick day management.  Discussed foot care.  Reminded to get yearly retinal exam.  Diabetes will be reassessed in 3 months.    Orders:  -     Lipid panel; Future  -     Hemoglobin A1c; Future  -     Comprehensive metabolic panel; Future  -     CBC w AUTO Differential; Future  -     MicroAlbumin, Urine, Random - Urine, Clean Catch; Future  -     Ambulatory Referral to Podiatry    2. Essential hypertension  Assessment & Plan:  Hypertension is worsening.  Complications of uncontrolled HTN discussed with the patient in detail including micro-and macro vascular complications. Discussed low salt diet, maintaining ideal body weight and regular exercise program as physiologic means to achieve blood pressure  control.  Advised to monitor blood pressure and follow lifestyle changes.   Increase lisinopril 40 mg p.o. daily.  Weight loss.  Regular aerobic exercise.  Stop smoking.  Medication changes per orders.  Blood pressure will be reassessed in 3 months.    Orders:  -     Lipid panel; Future  -     Comprehensive metabolic panel; Future  -     CBC w AUTO Differential; Future    3. Mixed hyperlipidemia  Assessment & Plan:  Lipid abnormalities are improving with treatment.  Nutritional counseling was provided. and Pharmacotherapy as ordered.  Lipids will be reassessed in 3 months.    Orders:  -     Lipid panel; Future  -     Comprehensive metabolic panel; Future  -     CBC w AUTO Differential; Future    4. Screening PSA (prostate specific antigen)  -     PSA SCREENING; Future    5. Toenail fungus  -     Ambulatory Referral to Podiatry    Other orders  -     lisinopril (PRINIVIL,ZESTRIL) 40 MG tablet; Take 1 tablet by mouth Daily.  Dispense: 90 tablet; Refill: 3    Follow Up   Return in about 3 months (around 6/18/2021) for Recheck.  Patient was given instructions and counseling regarding his condition or for health maintenance advice. Please see specific information pulled into the AVS if appropriate.

## 2021-03-18 NOTE — ASSESSMENT & PLAN NOTE
Hypertension is worsening.  Complications of uncontrolled HTN discussed with the patient in detail including micro-and macro vascular complications. Discussed low salt diet, maintaining ideal body weight and regular exercise program as physiologic means to achieve blood pressure control.  Advised to monitor blood pressure and follow lifestyle changes.   Increase lisinopril 40 mg p.o. daily.  Weight loss.  Regular aerobic exercise.  Stop smoking.  Medication changes per orders.  Blood pressure will be reassessed in 3 months.

## 2021-03-18 NOTE — ASSESSMENT & PLAN NOTE
Diabetes is unchanged.   Continue current treatment regimen.  Reminded to bring in blood sugar diary at next visit.  Dietary recommendations for ADA diet.  Regular aerobic exercise.  Discussed ways to avoid symptomatic hypoglycemia.  Discussed sick day management.  Discussed foot care.  Reminded to get yearly retinal exam.  Diabetes will be reassessed in 3 months.

## 2021-04-18 RX ORDER — NADOLOL 40 MG/1
TABLET ORAL
Qty: 90 TABLET | Refills: 1 | Status: SHIPPED | OUTPATIENT
Start: 2021-04-18 | End: 2021-10-13 | Stop reason: SDUPTHER

## 2021-05-21 RX ORDER — SITAGLIPTIN 100 MG/1
TABLET, FILM COATED ORAL
Qty: 90 TABLET | Refills: 3 | Status: SHIPPED | OUTPATIENT
Start: 2021-05-21

## 2021-10-12 RX ORDER — NADOLOL 40 MG/1
TABLET ORAL
Qty: 90 TABLET | Refills: 1 | OUTPATIENT
Start: 2021-10-12

## 2021-10-13 NOTE — TELEPHONE ENCOUNTER
Caller: Efren Mann    Relationship: Self    Medication requested (name and dosage): nadolol (CORGARD) 40 MG tablet    Pharmacy where request should be sent: [id number] [address] AT [facility] & [address] - [phone]  - [phone]   [phone]    Additional details provided by patient: PATIENT SCHEDULED FOR FIRST AVAILABLE THAT FITS WITH HIS WORK SCHEDULE    Best call back number: [phone]     Does the patient have less than a 3 day supply:  [] Yes  [x] No    Demetrice Moe Rep   10/13/21 14:38 EDT

## 2021-10-14 RX ORDER — NADOLOL 40 MG/1
40 TABLET ORAL DAILY
Qty: 90 TABLET | Refills: 0 | Status: SHIPPED | OUTPATIENT
Start: 2021-10-14 | End: 2022-01-21

## 2021-11-01 ENCOUNTER — OFFICE VISIT (OUTPATIENT)
Dept: FAMILY MEDICINE CLINIC | Facility: CLINIC | Age: 61
End: 2021-11-01

## 2021-11-01 VITALS
HEIGHT: 74 IN | SYSTOLIC BLOOD PRESSURE: 174 MMHG | TEMPERATURE: 96.9 F | WEIGHT: 240.2 LBS | BODY MASS INDEX: 30.83 KG/M2 | DIASTOLIC BLOOD PRESSURE: 99 MMHG | HEART RATE: 73 BPM | OXYGEN SATURATION: 97 %

## 2021-11-01 DIAGNOSIS — E11.65 TYPE 2 DIABETES MELLITUS WITH HYPERGLYCEMIA, WITHOUT LONG-TERM CURRENT USE OF INSULIN (HCC): Chronic | ICD-10-CM

## 2021-11-01 DIAGNOSIS — I10 ESSENTIAL HYPERTENSION: Chronic | ICD-10-CM

## 2021-11-01 DIAGNOSIS — E78.2 MIXED HYPERLIPIDEMIA: Primary | ICD-10-CM

## 2021-11-01 PROCEDURE — 99214 OFFICE O/P EST MOD 30 MIN: CPT | Performed by: FAMILY MEDICINE

## 2021-11-01 NOTE — PROGRESS NOTES
Subjective   Efren Mann is a 60 y.o. male.     History of Present Illness     Diabetes   He presents for his follow-up diabetic visit. He has type 2 diabetes mellitus. His disease course has been unchanged. hgbA1c 7.6 %, he is taking metformin, januvia and glimepiride. Pertinent negatives for hypoglycemia include no dizziness, nervousness/anxiousness or tremors. Pertinent negatives for diabetes include no blurred vision, no chest pain, no fatigue, no foot paresthesias, no foot ulcerations, no polydipsia, no polyphagia, no polyuria, no visual change and no weakness.    HTN  The patient  also present for 6 months  follow-up on hypertension and hyperlipidemia. BP elevated,  pertinent negatives include no headache, anxiety, blurred vision, chest pain, palpitations, peripheral edema or shortness of breath.  He is taking lisinopril and a beta-blocker.    The following portions of the patient's history were reviewed and updated as appropriate: past medical history, past social history, past surgical history and problem list.    Review of Systems   Constitutional: Negative for activity change, appetite change, fatigue and fever.   Respiratory: Negative for cough, shortness of breath and wheezing.    Cardiovascular: Negative for chest pain and palpitations.   Gastrointestinal: Negative for abdominal pain.   Endocrine: Negative for cold intolerance, polydipsia, polyphagia and polyuria.   Neurological: Negative for dizziness and headache.   Psychiatric/Behavioral: Negative for sleep disturbance and depressed mood. The patient is not nervous/anxious.        Objective   Physical Exam  Vitals reviewed.   Constitutional:       General: He is not in acute distress.     Appearance: He is well-developed.   Neck:      Thyroid: No thyromegaly.   Cardiovascular:      Rate and Rhythm: Normal rate.      Pulses: Normal pulses.      Heart sounds: Normal heart sounds.   Pulmonary:      Effort: Pulmonary effort is normal.      Breath  sounds: Normal breath sounds. No wheezing.   Abdominal:      General: Bowel sounds are normal.      Palpations: Abdomen is soft.      Tenderness: There is no abdominal tenderness.   Musculoskeletal:         General: Normal range of motion.      Cervical back: Normal range of motion and neck supple.   Neurological:      Mental Status: He is alert and oriented to person, place, and time.   Psychiatric:         Mood and Affect: Mood normal.       Vitals:    11/01/21 1543   BP: 174/99   Pulse: 73   Temp: 96.9 °F (36.1 °C)   SpO2: 97%     Current Outpatient Medications on File Prior to Visit   Medication Sig Dispense Refill   • fenofibrate (TRICOR) 145 MG tablet Take 1 tablet by mouth Daily.     • glimepiride (AMARYL) 2 MG tablet Take 1 tablet by mouth Daily.     • Januvia 100 MG tablet TAKE 1 TABLET BY MOUTH EVERY DAY 90 tablet 3   • lisinopril (PRINIVIL,ZESTRIL) 40 MG tablet Take 1 tablet by mouth Daily. 90 tablet 3   • metFORMIN (GLUCOPHAGE) 1000 MG tablet TAKE 2 TABLETS BY MOUTH EVERY  tablet 1   • nadolol (CORGARD) 40 MG tablet Take 1 tablet by mouth Daily. 90 tablet 0     No current facility-administered medications on file prior to visit.           Assessment/Plan   Problems Addressed this Visit        Cardiac and Vasculature    Hyperlipidemia - Primary     Lipid abnormalities are unchanged.  Lab result reviewed, patient is  unable to tolerate statin,  continue fenofibrate and watch low fat diet and exercise.  Nutritional counseling was provided.  Lipids will be reassessed in 3 months.         Relevant Orders    Lipid panel (Completed)    Comprehensive metabolic panel (Completed)    Essential hypertension     Hypertension is worsening.  Continue Lisinopril and Nadolol.  Dietary sodium restriction.  Weight loss.  Regular aerobic exercise.  Stop smoking.  Blood pressure will be reassessed in 3 months.         Relevant Orders    Lipid panel (Completed)    Comprehensive metabolic panel (Completed)        Endocrine and Metabolic    Type 2 diabetes mellitus with hyperglycemia, without long-term current use of insulin (HCC)     Diabetes is unchanged.   Dietary recommendations for ADA diet.  Regular aerobic exercise.  Discussed ways to avoid symptomatic hypoglycemia.  Discussed sick day management.  Discussed foot care.  Reminded to get yearly retinal exam.  Diabetes will be reassessed in 3 months.         Relevant Orders    Lipid panel (Completed)    Hemoglobin A1c (Completed)    Comprehensive metabolic panel (Completed)      Diagnoses       Codes Comments    Mixed hyperlipidemia    -  Primary ICD-10-CM: E78.2  ICD-9-CM: 272.2     Type 2 diabetes mellitus with hyperglycemia, without long-term current use of insulin (HCC)     ICD-10-CM: E11.65  ICD-9-CM: 250.00, 790.29     Essential hypertension     ICD-10-CM: I10  ICD-9-CM: 401.9

## 2021-11-05 ENCOUNTER — LAB (OUTPATIENT)
Dept: FAMILY MEDICINE CLINIC | Facility: CLINIC | Age: 61
End: 2021-11-05

## 2021-11-05 DIAGNOSIS — E78.2 MIXED HYPERLIPIDEMIA: ICD-10-CM

## 2021-11-05 DIAGNOSIS — I10 ESSENTIAL HYPERTENSION: Chronic | ICD-10-CM

## 2021-11-05 DIAGNOSIS — E11.65 TYPE 2 DIABETES MELLITUS WITH HYPERGLYCEMIA, WITHOUT LONG-TERM CURRENT USE OF INSULIN (HCC): Chronic | ICD-10-CM

## 2021-11-05 LAB
ALBUMIN SERPL-MCNC: 4.3 G/DL (ref 3.5–5.2)
ALBUMIN/GLOB SERPL: 1.6 G/DL
ALP SERPL-CCNC: 75 U/L (ref 39–117)
ALT SERPL W P-5'-P-CCNC: 18 U/L (ref 1–41)
ANION GAP SERPL CALCULATED.3IONS-SCNC: 2.4 MMOL/L (ref 5–15)
AST SERPL-CCNC: 15 U/L (ref 1–40)
BILIRUB SERPL-MCNC: 0.5 MG/DL (ref 0–1.2)
BUN SERPL-MCNC: 15 MG/DL (ref 8–23)
BUN/CREAT SERPL: 20.3 (ref 7–25)
CALCIUM SPEC-SCNC: 9.5 MG/DL (ref 8.6–10.5)
CHLORIDE SERPL-SCNC: 100 MMOL/L (ref 98–107)
CHOLEST SERPL-MCNC: 188 MG/DL (ref 0–200)
CO2 SERPL-SCNC: 27.6 MMOL/L (ref 22–29)
CREAT SERPL-MCNC: 0.74 MG/DL (ref 0.76–1.27)
GFR SERPL CREATININE-BSD FRML MDRD: 108 ML/MIN/1.73
GLOBULIN UR ELPH-MCNC: 2.7 GM/DL
GLUCOSE SERPL-MCNC: 210 MG/DL (ref 65–99)
HBA1C MFR BLD: 8.6 % (ref 3.5–5.6)
HDLC SERPL-MCNC: 29 MG/DL (ref 40–60)
LDLC SERPL CALC-MCNC: 103 MG/DL (ref 0–100)
LDLC/HDLC SERPL: 3.2 {RATIO}
POTASSIUM SERPL-SCNC: 4.8 MMOL/L (ref 3.5–5.2)
PROT SERPL-MCNC: 7 G/DL (ref 6–8.5)
SODIUM SERPL-SCNC: 130 MMOL/L (ref 136–145)
TRIGL SERPL-MCNC: 331 MG/DL (ref 0–150)
VLDLC SERPL-MCNC: 56 MG/DL (ref 5–40)

## 2021-11-05 PROCEDURE — 80061 LIPID PANEL: CPT | Performed by: FAMILY MEDICINE

## 2021-11-05 PROCEDURE — 80053 COMPREHEN METABOLIC PANEL: CPT | Performed by: FAMILY MEDICINE

## 2021-11-05 PROCEDURE — 36415 COLL VENOUS BLD VENIPUNCTURE: CPT

## 2021-11-05 PROCEDURE — 83036 HEMOGLOBIN GLYCOSYLATED A1C: CPT | Performed by: FAMILY MEDICINE

## 2021-11-07 NOTE — ASSESSMENT & PLAN NOTE
Lipid abnormalities are unchanged.  Lab result reviewed, patient is  unable to tolerate statin,  continue fenofibrate and watch low fat diet and exercise.  Nutritional counseling was provided.  Lipids will be reassessed in 3 months.

## 2021-11-07 NOTE — ASSESSMENT & PLAN NOTE
Hypertension is worsening.  Continue Lisinopril and Nadolol.  Dietary sodium restriction.  Weight loss.  Regular aerobic exercise.  Stop smoking.  Blood pressure will be reassessed in 3 months.

## 2022-01-21 RX ORDER — NADOLOL 40 MG/1
40 TABLET ORAL DAILY
Qty: 90 TABLET | Refills: 0 | Status: SHIPPED | OUTPATIENT
Start: 2022-01-21
